# Patient Record
Sex: MALE | Race: WHITE | NOT HISPANIC OR LATINO | URBAN - METROPOLITAN AREA
[De-identification: names, ages, dates, MRNs, and addresses within clinical notes are randomized per-mention and may not be internally consistent; named-entity substitution may affect disease eponyms.]

---

## 2024-08-13 ENCOUNTER — APPOINTMENT (OUTPATIENT)
Dept: URBAN - METROPOLITAN AREA CLINIC 232 | Age: 64
Setting detail: DERMATOLOGY
End: 2024-08-14

## 2024-08-13 DIAGNOSIS — L20.89 OTHER ATOPIC DERMATITIS: ICD-10-CM

## 2024-08-13 PROCEDURE — OTHER COUNSELING: OTHER

## 2024-08-13 PROCEDURE — OTHER PRESCRIPTION SAMPLES PROVIDED: OTHER

## 2024-08-13 PROCEDURE — OTHER MIPS QUALITY: OTHER

## 2024-08-13 PROCEDURE — OTHER TREATMENT REGIMEN: OTHER

## 2024-08-13 PROCEDURE — 99203 OFFICE O/P NEW LOW 30 MIN: CPT

## 2024-08-13 ASSESSMENT — LOCATION SIMPLE DESCRIPTION DERM
LOCATION SIMPLE: LEFT SUPERIOR EYELID
LOCATION SIMPLE: RIGHT EYEBROW
LOCATION SIMPLE: LEFT FOREHEAD
LOCATION SIMPLE: SCALP
LOCATION SIMPLE: RIGHT SUPERIOR EYELID

## 2024-08-13 ASSESSMENT — LOCATION ZONE DERM
LOCATION ZONE: SCALP
LOCATION ZONE: FACE
LOCATION ZONE: EYELID

## 2024-08-13 ASSESSMENT — LOCATION DETAILED DESCRIPTION DERM
LOCATION DETAILED: RIGHT MEDIAL SUPERIOR EYELID
LOCATION DETAILED: LEFT INFERIOR FOREHEAD
LOCATION DETAILED: LEFT LATERAL SUPERIOR EYELID
LOCATION DETAILED: LEFT CENTRAL POSTAURICULAR SKIN
LOCATION DETAILED: RIGHT CENTRAL EYEBROW

## 2024-08-13 NOTE — PROCEDURE: PRESCRIPTION SAMPLES PROVIDED
Detail Level: Zone
Expiration Date (Optional): 4/2026
Samples Given: Eucrisa 5 tubes to be used bid to ears and eyelids
Lot/Batch Number (Optional): [id number]

## 2024-08-13 NOTE — PROCEDURE: COUNSELING
Cleanser Recommendations: Suggest using mild cleansers such as Dove, Cetaphil or CeraVe
Detail Level: Zone
Moisturizer Recommendations: Suggest mild moisturizers such as Aveeno, Cetaphil or CeraVe

## 2024-08-13 NOTE — HPI: SKIN LESION
What Type Of Note Output Would You Prefer (Optional)?: Standard Output
How Severe Is Your Skin Lesion?: mild
Has Your Skin Lesion Been Treated?: not been treated
Is This A New Presentation, Or A Follow-Up?: Skin Lesions
Additional History: Irritation behind ear and on eyelids started around a year ago, irritation on eyelids has been worse for about month\\nRetina spec. gave Triamcinolone for eyelids for a few days which didn’t help, ENT gave hydrocortisone for inner ear which hasn’t helped behind ear

## 2024-08-13 NOTE — PROCEDURE: TREATMENT REGIMEN
Samples Given: Eucrisa twice daily for maintenance LOT: [id number] EXP:04/2026
Plan: Discussed appropriate use of topical steroids. It seems pt may have stopped a bit too soon but willing to treat all areas bid for 2 weeks then move to Eucrisa. Samples were given since his co-pay is high. If/when he needs more, he can call the office. Pimecrolimus and Tacrolimus are a high co-pay for pt. Pt will call for refills for triamcinolone 0.025% cream or if he wants ointment.\\nPt may consider seeing an allergist for further testing. He saw an allergist when he was a teenager and found to have multiple allergies. He feels he is managing with his current allergy regimen.
Detail Level: Zone

## 2024-12-23 ENCOUNTER — RX ONLY (RX ONLY)
Age: 64
End: 2024-12-23

## 2024-12-23 RX ORDER — TRIAMCINOLONE ACETONIDE 0.25 MG/G
CREAM TOPICAL
Qty: 80 | Refills: 1 | Status: ERX | COMMUNITY
Start: 2024-12-23